# Patient Record
Sex: MALE | Race: WHITE | Employment: FULL TIME | URBAN - METROPOLITAN AREA
[De-identification: names, ages, dates, MRNs, and addresses within clinical notes are randomized per-mention and may not be internally consistent; named-entity substitution may affect disease eponyms.]

---

## 2020-05-05 ENCOUNTER — HOSPITAL ENCOUNTER (OUTPATIENT)
Dept: PREADMISSION TESTING | Age: 55
Discharge: HOME | End: 2020-05-09
Payer: COMMERCIAL

## 2020-05-05 VITALS
HEART RATE: 60 BPM | RESPIRATION RATE: 18 BRPM | WEIGHT: 182.54 LBS | HEIGHT: 74 IN | BODY MASS INDEX: 23.43 KG/M2 | DIASTOLIC BLOOD PRESSURE: 78 MMHG | OXYGEN SATURATION: 99 % | TEMPERATURE: 97.7 F | SYSTOLIC BLOOD PRESSURE: 140 MMHG

## 2020-05-05 LAB
HCT VFR BLD CALC: 45.7 % (ref 40.7–50.3)
HEMOGLOBIN: 14.9 G/DL (ref 13–17)

## 2020-05-05 PROCEDURE — 93005 ELECTROCARDIOGRAM TRACING: CPT | Performed by: OTOLARYNGOLOGY

## 2020-05-05 PROCEDURE — 85014 HEMATOCRIT: CPT

## 2020-05-05 PROCEDURE — 36415 COLL VENOUS BLD VENIPUNCTURE: CPT

## 2020-05-05 PROCEDURE — 85018 HEMOGLOBIN: CPT

## 2020-05-05 RX ORDER — SODIUM CHLORIDE, SODIUM LACTATE, POTASSIUM CHLORIDE, CALCIUM CHLORIDE 600; 310; 30; 20 MG/100ML; MG/100ML; MG/100ML; MG/100ML
1000 INJECTION, SOLUTION INTRAVENOUS CONTINUOUS
Status: CANCELLED | OUTPATIENT
Start: 2020-05-05

## 2020-05-05 ASSESSMENT — PAIN DESCRIPTION - LOCATION: LOCATION: GENERALIZED

## 2020-05-05 ASSESSMENT — PAIN SCALES - GENERAL: PAINLEVEL_OUTOF10: 2

## 2020-05-05 ASSESSMENT — PAIN DESCRIPTION - PAIN TYPE: TYPE: CHRONIC PAIN

## 2020-05-05 NOTE — H&P (VIEW-ONLY)
History and Physical    Pt Name: Tatiana Baumann  MRN: [id number]  YOB: 1965  Date of evaluation: 5/5/2020  Primary Care Physician: Yosef Bean MD  Patient evaluated at the request of  Dr. Krysten Vee    Reason for evaluation:  Vocal cord   White  Plaque   SUBJECTIVE:   History of Chief Complaint:      Smita Aldridge is a 53 y.o. male   Who had a hx of dysphagia for over 1 yr approx , ? GERD , quit smoking over 10 yrs ago , he notices a change in his voice. Had a evaluation with Dr. Krysten Vee in Mar/2020 was told white plaque on his vocal cord               Past Medical History      has a past medical history of Ankylosing spondylitis ([facility] [address] Arthritis, Wellness examination, Wellness examination, and Wellness examination. Past Surgical History   has a past surgical history that includes Hand tendon surgery (Right); Tonsillectomy; Knee arthroscopy (Left); and Colonoscopy. Medications   Scheduled Meds:  Continuous Infusions:  PRN Meds:. Allergies  has No Known Allergies. Family History    family history includes Cancer in his father and mother; Stroke in his mother.     Family Status   Relation Name Status    Mother  (Not Specified)    Father  (Not Specified)         Social History  Social History     Socioeconomic History    Marital status:      Spouse name: Not on file    Number of children: Not on file    Years of education: Not on file    Highest education level: Not on file   Occupational History    Not on file   Social Needs    Financial resource strain: Not on file    Food insecurity     Worry: Not on file     Inability: Not on file    Transportation needs     Medical: Not on file     Non-medical: Not on file   Tobacco Use    Smoking status: Never Smoker    Smokeless tobacco: Never Used   Substance and Sexual Activity    Alcohol use: Not Currently    Drug use: Never    Sexual activity: Not on file   Lifestyle    Physical activity     Days per week: Not on file

## 2020-05-05 NOTE — ANESTHESIA PRE-OP
Anesthesia Focused Assessment    STOP-BANG Sleep Apnea Questionnaire    Obstructive Sleep Apnea:                                                                 No     Machine used:                                                                                  No            SNORE loudly (heard through closed doors)? No      TIRED, fatigued, sleepy during daytime? No      OBSERVED stopping breathing during sleep? No      High blood PRESSURE being treated? No      BMI over 35? No  AGE over 48? Yes  NECK circumference over 16\"? No  GENDER (male)? No                High risk 5-8  Intermediate risk 3-4  Low risk 0-2    Total 3  High risk 5-8  Intermediate risk 3-4  Low risk 0-2      Type 1 DM:                                                                                        No    TYPE 2:                                                                                             No    If yes, insulin dependent? No    Coronary Artery Disease :                                                                No        Active smoker                                                                                   No    Drinks Alcohol                                                                                   Yes    Dentition: Dentures                                                                            No              Partial                                                                                                 No    Any loose or missing teeth                                                                 No    Defib / AICD / Pacemaker:                                                               No    Renal Failure: No    If Yes, On dialysis?       Hx of anesthesia complications with Patient                               No    Hx of anesthesia complications with family No    Medical or cardiac clearance ordered:                                         No    Anesthesiologist called:                                                                No    LAURA Reilly PA-C  Electronically signed 5/5/2020 at 11:03 AM

## 2020-05-05 NOTE — H&P
History and Physical    Pt Name: Jeannie Deluca  MRN: [id number]  YOB: 1965  Date of evaluation: 5/5/2020  Primary Care Physician: Hernando Okeefe MD  Patient evaluated at the request of  Dr. WADSWORTH    Reason for evaluation:  Vocal cord   White  Plaque   SUBJECTIVE:   History of Chief Complaint:      Kip Hair is a 53 y.o. male   Who had a hx of dysphagia for over 1 yr approx , ? GERD , quit smoking over 10 yrs ago , he notices a change in his voice. Had a evaluation with Dr. WADSWORTH in Mar/2020 was told white plaque on his vocal cord               Past Medical History      has a past medical history of Ankylosing spondylitis ([facility] [address] Arthritis, Wellness examination, Wellness examination, and Wellness examination. Past Surgical History   has a past surgical history that includes Hand tendon surgery (Right); Tonsillectomy; Knee arthroscopy (Left); and Colonoscopy. Medications   Scheduled Meds:  Continuous Infusions:  PRN Meds:. Allergies  has No Known Allergies. Family History    family history includes Cancer in his father and mother; Stroke in his mother.     Family Status   Relation Name Status    Mother  (Not Specified)    Father  (Not Specified)         Social History  Social History     Socioeconomic History    Marital status:      Spouse name: Not on file    Number of children: Not on file    Years of education: Not on file    Highest education level: Not on file   Occupational History    Not on file   Social Needs    Financial resource strain: Not on file    Food insecurity     Worry: Not on file     Inability: Not on file    Transportation needs     Medical: Not on file     Non-medical: Not on file   Tobacco Use    Smoking status: Never Smoker    Smokeless tobacco: Never Used   Substance and Sexual Activity    Alcohol use: Not Currently    Drug use: Never    Sexual activity: Not on file   Lifestyle    Physical activity     Days per week: Not on file

## 2020-05-06 LAB
EKG ATRIAL RATE: 47 BPM
EKG P AXIS: 59 DEGREES
EKG P-R INTERVAL: 162 MS
EKG Q-T INTERVAL: 442 MS
EKG QRS DURATION: 92 MS
EKG QTC CALCULATION (BAZETT): 391 MS
EKG R AXIS: -17 DEGREES
EKG T AXIS: 32 DEGREES
EKG VENTRICULAR RATE: 47 BPM

## 2020-05-06 PROCEDURE — 93010 ELECTROCARDIOGRAM REPORT: CPT | Performed by: INTERNAL MEDICINE

## 2020-05-09 ENCOUNTER — HOSPITAL ENCOUNTER (OUTPATIENT)
Dept: PREADMISSION TESTING | Age: 55
Setting detail: SPECIMEN
Discharge: HOME | End: 2020-05-13
Payer: COMMERCIAL

## 2020-05-09 PROCEDURE — U0003 INFECTIOUS AGENT DETECTION BY NUCLEIC ACID (DNA OR RNA); SEVERE ACUTE RESPIRATORY SYNDROME CORONAVIRUS 2 (SARS-COV-2) (CORONAVIRUS DISEASE [COVID-19]), AMPLIFIED PROBE TECHNIQUE, MAKING USE OF HIGH THROUGHPUT TECHNOLOGIES AS DESCRIBED BY CMS-2020-01-R: HCPCS

## 2020-05-10 LAB
SARS-COV-2, PCR: NORMAL
SARS-COV-2, RAPID: NORMAL
SARS-COV-2: NOT DETECTED
SOURCE: NORMAL

## 2020-05-11 ENCOUNTER — ANESTHESIA (OUTPATIENT)
Dept: OPERATING ROOM | Age: 55
End: 2020-05-11
Payer: COMMERCIAL

## 2020-05-11 ENCOUNTER — ANESTHESIA EVENT (OUTPATIENT)
Dept: OPERATING ROOM | Age: 55
End: 2020-05-11
Payer: COMMERCIAL

## 2020-05-11 ENCOUNTER — HOSPITAL ENCOUNTER (OUTPATIENT)
Age: 55
Setting detail: OUTPATIENT SURGERY
Discharge: HOME | End: 2020-05-11
Attending: OTOLARYNGOLOGY | Admitting: OTOLARYNGOLOGY
Payer: COMMERCIAL

## 2020-05-11 VITALS
SYSTOLIC BLOOD PRESSURE: 131 MMHG | TEMPERATURE: 96.8 F | OXYGEN SATURATION: 100 % | RESPIRATION RATE: 14 BRPM | DIASTOLIC BLOOD PRESSURE: 76 MMHG | WEIGHT: 182 LBS | BODY MASS INDEX: 23.36 KG/M2 | HEIGHT: 74 IN | HEART RATE: 54 BPM

## 2020-05-11 VITALS — SYSTOLIC BLOOD PRESSURE: 134 MMHG | OXYGEN SATURATION: 100 % | DIASTOLIC BLOOD PRESSURE: 85 MMHG | TEMPERATURE: 63.8 F

## 2020-05-11 PROCEDURE — 6360000002 HC RX W HCPCS: Performed by: NURSE ANESTHETIST, CERTIFIED REGISTERED

## 2020-05-11 PROCEDURE — 7100000011 HC PHASE II RECOVERY - ADDTL 15 MIN: Performed by: OTOLARYNGOLOGY

## 2020-05-11 PROCEDURE — 2500000003 HC RX 250 WO HCPCS: Performed by: NURSE ANESTHETIST, CERTIFIED REGISTERED

## 2020-05-11 PROCEDURE — 3600000012 HC SURGERY LEVEL 2 ADDTL 15MIN: Performed by: OTOLARYNGOLOGY

## 2020-05-11 PROCEDURE — 3700000000 HC ANESTHESIA ATTENDED CARE: Performed by: OTOLARYNGOLOGY

## 2020-05-11 PROCEDURE — 88305 TISSUE EXAM BY PATHOLOGIST: CPT

## 2020-05-11 PROCEDURE — 3700000001 HC ADD 15 MINUTES (ANESTHESIA): Performed by: OTOLARYNGOLOGY

## 2020-05-11 PROCEDURE — 7100000001 HC PACU RECOVERY - ADDTL 15 MIN: Performed by: OTOLARYNGOLOGY

## 2020-05-11 PROCEDURE — 3600000002 HC SURGERY LEVEL 2 BASE: Performed by: OTOLARYNGOLOGY

## 2020-05-11 PROCEDURE — 7100000010 HC PHASE II RECOVERY - FIRST 15 MIN: Performed by: OTOLARYNGOLOGY

## 2020-05-11 PROCEDURE — 2580000003 HC RX 258: Performed by: ANESTHESIOLOGY

## 2020-05-11 PROCEDURE — 2709999900 HC NON-CHARGEABLE SUPPLY: Performed by: OTOLARYNGOLOGY

## 2020-05-11 PROCEDURE — 7100000000 HC PACU RECOVERY - FIRST 15 MIN: Performed by: OTOLARYNGOLOGY

## 2020-05-11 RX ORDER — SODIUM CHLORIDE 0.9 % (FLUSH) 0.9 %
10 SYRINGE (ML) INJECTION EVERY 12 HOURS SCHEDULED
Status: CANCELLED | OUTPATIENT
Start: 2020-05-11

## 2020-05-11 RX ORDER — DEXAMETHASONE SODIUM PHOSPHATE 10 MG/ML
INJECTION INTRAMUSCULAR; INTRAVENOUS PRN
Status: DISCONTINUED | OUTPATIENT
Start: 2020-05-11 | End: 2020-05-11 | Stop reason: SDUPTHER

## 2020-05-11 RX ORDER — SUCCINYLCHOLINE CHLORIDE 20 MG/ML
INJECTION INTRAMUSCULAR; INTRAVENOUS PRN
Status: DISCONTINUED | OUTPATIENT
Start: 2020-05-11 | End: 2020-05-11 | Stop reason: SDUPTHER

## 2020-05-11 RX ORDER — ONDANSETRON 2 MG/ML
INJECTION INTRAMUSCULAR; INTRAVENOUS PRN
Status: DISCONTINUED | OUTPATIENT
Start: 2020-05-11 | End: 2020-05-11 | Stop reason: SDUPTHER

## 2020-05-11 RX ORDER — SODIUM CHLORIDE, SODIUM LACTATE, POTASSIUM CHLORIDE, CALCIUM CHLORIDE 600; 310; 30; 20 MG/100ML; MG/100ML; MG/100ML; MG/100ML
INJECTION, SOLUTION INTRAVENOUS CONTINUOUS
Status: CANCELLED | OUTPATIENT
Start: 2020-05-11

## 2020-05-11 RX ORDER — NEOSTIGMINE METHYLSULFATE 5 MG/5 ML
SYRINGE (ML) INTRAVENOUS PRN
Status: DISCONTINUED | OUTPATIENT
Start: 2020-05-11 | End: 2020-05-11 | Stop reason: SDUPTHER

## 2020-05-11 RX ORDER — PROPOFOL 10 MG/ML
INJECTION, EMULSION INTRAVENOUS PRN
Status: DISCONTINUED | OUTPATIENT
Start: 2020-05-11 | End: 2020-05-11 | Stop reason: SDUPTHER

## 2020-05-11 RX ORDER — MIDAZOLAM HYDROCHLORIDE 1 MG/ML
2 INJECTION INTRAMUSCULAR; INTRAVENOUS
Status: CANCELLED | OUTPATIENT
Start: 2020-05-11 | End: 2020-05-11

## 2020-05-11 RX ORDER — ONDANSETRON 2 MG/ML
4 INJECTION INTRAMUSCULAR; INTRAVENOUS ONCE
Status: CANCELLED | OUTPATIENT
Start: 2020-05-11 | End: 2020-05-11

## 2020-05-11 RX ORDER — LIDOCAINE HYDROCHLORIDE 10 MG/ML
INJECTION, SOLUTION EPIDURAL; INFILTRATION; INTRACAUDAL; PERINEURAL PRN
Status: DISCONTINUED | OUTPATIENT
Start: 2020-05-11 | End: 2020-05-11 | Stop reason: SDUPTHER

## 2020-05-11 RX ORDER — SODIUM CHLORIDE, SODIUM LACTATE, POTASSIUM CHLORIDE, CALCIUM CHLORIDE 600; 310; 30; 20 MG/100ML; MG/100ML; MG/100ML; MG/100ML
1000 INJECTION, SOLUTION INTRAVENOUS CONTINUOUS
Status: DISCONTINUED | OUTPATIENT
Start: 2020-05-11 | End: 2020-05-11 | Stop reason: HOSPADM

## 2020-05-11 RX ORDER — FENTANYL CITRATE 50 UG/ML
25 INJECTION, SOLUTION INTRAMUSCULAR; INTRAVENOUS EVERY 5 MIN PRN
Status: DISCONTINUED | OUTPATIENT
Start: 2020-05-11 | End: 2020-05-11 | Stop reason: HOSPADM

## 2020-05-11 RX ORDER — FENTANYL CITRATE 50 UG/ML
25 INJECTION, SOLUTION INTRAMUSCULAR; INTRAVENOUS ONCE
Status: CANCELLED | OUTPATIENT
Start: 2020-05-11 | End: 2020-05-11

## 2020-05-11 RX ORDER — GLYCOPYRROLATE 1 MG/5 ML
SYRINGE (ML) INTRAVENOUS PRN
Status: DISCONTINUED | OUTPATIENT
Start: 2020-05-11 | End: 2020-05-11 | Stop reason: SDUPTHER

## 2020-05-11 RX ORDER — ROCURONIUM BROMIDE 10 MG/ML
INJECTION, SOLUTION INTRAVENOUS PRN
Status: DISCONTINUED | OUTPATIENT
Start: 2020-05-11 | End: 2020-05-11 | Stop reason: SDUPTHER

## 2020-05-11 RX ORDER — FENTANYL CITRATE 50 UG/ML
50 INJECTION, SOLUTION INTRAMUSCULAR; INTRAVENOUS EVERY 5 MIN PRN
Status: DISCONTINUED | OUTPATIENT
Start: 2020-05-11 | End: 2020-05-11 | Stop reason: HOSPADM

## 2020-05-11 RX ORDER — FENTANYL CITRATE 50 UG/ML
INJECTION, SOLUTION INTRAMUSCULAR; INTRAVENOUS PRN
Status: DISCONTINUED | OUTPATIENT
Start: 2020-05-11 | End: 2020-05-11 | Stop reason: SDUPTHER

## 2020-05-11 RX ORDER — SODIUM CHLORIDE 0.9 % (FLUSH) 0.9 %
10 SYRINGE (ML) INJECTION PRN
Status: CANCELLED | OUTPATIENT
Start: 2020-05-11

## 2020-05-11 RX ADMIN — PROPOFOL 160 MG: 10 INJECTION, EMULSION INTRAVENOUS at 07:57

## 2020-05-11 RX ADMIN — FENTANYL CITRATE 100 MCG: 50 INJECTION INTRAMUSCULAR; INTRAVENOUS at 07:57

## 2020-05-11 RX ADMIN — SODIUM CHLORIDE, POTASSIUM CHLORIDE, SODIUM LACTATE AND CALCIUM CHLORIDE 1000 ML: 600; 310; 30; 20 INJECTION, SOLUTION INTRAVENOUS at 06:48

## 2020-05-11 RX ADMIN — ONDANSETRON 4 MG: 2 INJECTION, SOLUTION INTRAMUSCULAR; INTRAVENOUS at 08:14

## 2020-05-11 RX ADMIN — Medication 4 MG: at 08:14

## 2020-05-11 RX ADMIN — ROCURONIUM BROMIDE 15 MG: 10 INJECTION INTRAVENOUS at 07:57

## 2020-05-11 RX ADMIN — SUCCINYLCHOLINE CHLORIDE 100 MG: 20 INJECTION, SOLUTION INTRAMUSCULAR; INTRAVENOUS at 07:57

## 2020-05-11 RX ADMIN — LIDOCAINE HYDROCHLORIDE 50 MG: 10 INJECTION, SOLUTION EPIDURAL; INFILTRATION; INTRACAUDAL; PERINEURAL at 07:57

## 2020-05-11 RX ADMIN — DEXAMETHASONE SODIUM PHOSPHATE 10 MG: 10 INJECTION INTRAMUSCULAR; INTRAVENOUS at 07:58

## 2020-05-11 RX ADMIN — Medication 0.8 MG: at 08:14

## 2020-05-11 ASSESSMENT — PAIN SCALES - GENERAL
PAINLEVEL_OUTOF10: 1
PAINLEVEL_OUTOF10: 2
PAINLEVEL_OUTOF10: 1
PAINLEVEL_OUTOF10: 2
PAINLEVEL_OUTOF10: 2

## 2020-05-11 ASSESSMENT — PULMONARY FUNCTION TESTS
PIF_VALUE: 0
PIF_VALUE: 19
PIF_VALUE: 19
PIF_VALUE: 17
PIF_VALUE: 17
PIF_VALUE: 14
PIF_VALUE: 13
PIF_VALUE: 15
PIF_VALUE: 17
PIF_VALUE: 21
PIF_VALUE: 1
PIF_VALUE: 5
PIF_VALUE: 19
PIF_VALUE: 0
PIF_VALUE: 5
PIF_VALUE: 20
PIF_VALUE: 1
PIF_VALUE: 0
PIF_VALUE: 10
PIF_VALUE: 19
PIF_VALUE: 1
PIF_VALUE: 24
PIF_VALUE: 1
PIF_VALUE: 18
PIF_VALUE: 0
PIF_VALUE: 19
PIF_VALUE: 1
PIF_VALUE: 14
PIF_VALUE: 25
PIF_VALUE: 19
PIF_VALUE: 4
PIF_VALUE: 17
PIF_VALUE: 13
PIF_VALUE: 20
PIF_VALUE: 10

## 2020-05-11 ASSESSMENT — PAIN DESCRIPTION - PAIN TYPE: TYPE: SURGICAL PAIN

## 2020-05-11 ASSESSMENT — PAIN DESCRIPTION - LOCATION: LOCATION: THROAT

## 2020-05-11 ASSESSMENT — PAIN - FUNCTIONAL ASSESSMENT: PAIN_FUNCTIONAL_ASSESSMENT: 0-10

## 2020-05-11 NOTE — ANESTHESIA PRE PROCEDURE
Department of Anesthesiology  Preprocedure Note       Name:  Chiki Xie   Age:  53 y.o.  :  1965                                          MRN:  [id number]         Date:  2020      Surgeon: Sterling Barnhart):  Shahla Aden MD    Procedure: DIRECT MICRO LARYNGOSCOPY WITH RIGHT VOCAL CORD BIOPSY (N/A )    Medications prior to admission:   Prior to Admission medications    Not on File       Current medications:    Current Facility-Administered Medications   Medication Dose Route Frequency Provider Last Rate Last Dose    lactated ringers infusion 1,000 mL  1,000 mL Intravenous Continuous Ellyn Cheung MD 50 mL/hr at 20 0648 1,000 mL at 20 8701       Allergies:  No Known Allergies    Problem List:  There is no problem list on file for this patient.       Past Medical History:        Diagnosis Date    Ankylosing spondylitis ([facility] [address] 2019    Arthritis     Wellness examination     [facility] gardner-2020    Wellness examination     rheumatoid doctor-at [facility]-last visit 2020    Wellness examination     ent-dr [facility] last visit 2020       Past Surgical History:        Procedure Laterality Date    COLONOSCOPY      HAND TENDON SURGERY Right     KNEE ARTHROSCOPY Left     TONSILLECTOMY         Social History:    Social History     Tobacco Use    Smoking status: Never Smoker    Smokeless tobacco: Never Used   Substance Use Topics    Alcohol use: Not Currently                                Counseling given: Not Answered      Vital Signs (Current):   Vitals:    20 0625 20 0636   BP:  128/86   Pulse:  65   Resp:  20   Temp:  97.9 °F (36.6 °C)   TempSrc:  Temporal   SpO2:  99%   Weight: 182 lb (82.6 kg)    Height: 6' 2\" (1.88 m)                                               BP Readings from Last 3 Encounters:   20 128/86   20 (!) 140/78       NPO Status: Time of last liquid consumption:                         Time of last solid consumption:  reviewed and agrees with 9700 Corina Nogueira MD   5/11/2020

## 2020-05-11 NOTE — PROGRESS NOTES
Discharge instructions reviewed with patient and wife,  Jt Garcia via telephone.   Both acknowledged understanding

## 2020-05-12 LAB — SURGICAL PATHOLOGY REPORT: NORMAL

## 2020-05-12 NOTE — OP NOTE
draped in standard fashion. Bite guard was used to protect  the upper dentition. A Deido laryngoscope was used to perform direct  laryngoscopy. The vallecula was unremarkable. Both piriform sinuses  were unremarkable. The laryngeal and lingual aspects of the epiglottis  were unremarkable. The postcricoid region displayed mild pale mucosal  edema only. The endotracheal tube was displaced gently with the  laryngoscope. The posterior glottis was unremarkable. Next, the same laryngoscope was used to examine the anterior glottis and  immediate subglottis. Findings were as above. The laryngoscope was  then placed into suspension. Operating microscope was brought into  view. Micro upbiting forceps were used to obtain incisional biopsies  from the superior and medial aspect of the right true vocal cord lesion. Bleeding was minimal.  Limited suctioning was required. Quarter-inch  cotton pledget impregnated with adrenaline was placed about the surgical  site. The laryngoscope was then released from suspension and slowly and  gently removed. Limited suctioning was required. All apparatus were  removed from the oral cavity. Dental units were intact. The cotton  pledget was removed. The patient was extubated and transported to the  postanesthesia recovery unit in stable condition. Voice was strong and  intact and grossly unchanged. This completed the procedures. The  patient tolerated the surgery without obvious complication. ANESTHESIA:  General endotracheal.    BLOOD GIVEN:  None. COMPLICATIONS:  None. DRAINS:  None. ESTIMATED BLOOD LOSS:  Minimal.    SPECIMENS:  Incisional biopsy, right true vocal cord for surgical  pathology. DISPOSITION:  Postoperative recovery unit in stable condition.         Catarina Goldsmith    D: 05/11/2020 8:58:28       T: 05/11/2020 9:02:44     [email or web address]  Job#: [id number]     Doc#: [id number]    CC:

## (undated) DEVICE — COVER LT HNDL BLU PLAS

## (undated) DEVICE — DRAPE,REIN 53X77,STERILE: Brand: MEDLINE

## (undated) DEVICE — GLOVE ORANGE PI 7   MSG9070

## (undated) DEVICE — POSITIONER HD W8XH4XL8.5IN RASPBERRY FOAM SLT

## (undated) DEVICE — JAR BONE ST

## (undated) DEVICE — GAUZE,SPONGE,4"X4",16PLY,XRAY,STRL,LF: Brand: MEDLINE

## (undated) DEVICE — SYRINGE MED 10ML LUERLOCK TIP W/O SFTY DISP

## (undated) DEVICE — CODMAN® SURGICAL PATTIES 1/2" X 1/2" (1.27CM X 1.27CM): Brand: CODMAN®

## (undated) DEVICE — CONTAINER,SPECIMEN,4OZ,OR STRL: Brand: MEDLINE

## (undated) DEVICE — GOWN,AURORA,NONREINFORCED,LARGE: Brand: MEDLINE

## (undated) DEVICE — MARKER,SKIN,WI/RULER AND LABELS: Brand: MEDLINE

## (undated) DEVICE — TUBING, SUCTION, 9/32" X 20', STRAIGHT: Brand: MEDLINE INDUSTRIES, INC.

## (undated) DEVICE — GARMENT,MEDLINE,DVT,INT,CALF,MED, GEN2: Brand: MEDLINE

## (undated) DEVICE — NEEDLE HYPO 25GA L1.5IN BLU POLYPR HUB S STL REG BVL STR

## (undated) DEVICE — TOWEL,OR,DSP,ST,NATURAL,DLX,4/PK,20PK/CS: Brand: MEDLINE